# Patient Record
Sex: MALE | Race: BLACK OR AFRICAN AMERICAN | Employment: UNEMPLOYED | URBAN - METROPOLITAN AREA
[De-identification: names, ages, dates, MRNs, and addresses within clinical notes are randomized per-mention and may not be internally consistent; named-entity substitution may affect disease eponyms.]

---

## 2017-04-25 ENCOUNTER — APPOINTMENT (OUTPATIENT)
Dept: CT IMAGING | Age: 27
End: 2017-04-25
Attending: NURSE PRACTITIONER
Payer: SELF-PAY

## 2017-04-25 ENCOUNTER — HOSPITAL ENCOUNTER (EMERGENCY)
Age: 27
Discharge: HOME HEALTH | End: 2017-04-25
Attending: EMERGENCY MEDICINE
Payer: SELF-PAY

## 2017-04-25 VITALS
TEMPERATURE: 98.1 F | RESPIRATION RATE: 18 BRPM | WEIGHT: 220 LBS | SYSTOLIC BLOOD PRESSURE: 130 MMHG | HEART RATE: 76 BPM | BODY MASS INDEX: 35.36 KG/M2 | OXYGEN SATURATION: 97 % | DIASTOLIC BLOOD PRESSURE: 76 MMHG | HEIGHT: 66 IN

## 2017-04-25 DIAGNOSIS — S50.311A ELBOW ABRASION, RIGHT, INITIAL ENCOUNTER: ICD-10-CM
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
## 2017-04-25 LAB
ANION GAP BLD CALC-SCNC: 8 MMOL/L (ref 3–18)
BUN SERPL-MCNC: 11 MG/DL (ref 7–18)
BUN/CREAT SERPL: 11 (ref 12–20)
CALCIUM SERPL-MCNC: 9.2 MG/DL (ref 8.5–10.1)
CHLORIDE SERPL-SCNC: 105 MMOL/L (ref 100–108)
CO2 SERPL-SCNC: 27 MMOL/L (ref 21–32)
CREAT SERPL-MCNC: 0.96 MG/DL (ref 0.6–1.3)
GLUCOSE SERPL-MCNC: 81 MG/DL (ref 74–99)
POTASSIUM SERPL-SCNC: 4.3 MMOL/L (ref 3.5–5.5)
SODIUM SERPL-SCNC: 140 MMOL/L (ref 136–145)

## 2017-04-25 PROCEDURE — 74011636320 HC RX REV CODE- 636/320: Performed by: EMERGENCY MEDICINE

## 2017-04-25 PROCEDURE — 96360 HYDRATION IV INFUSION INIT: CPT

## 2017-04-25 PROCEDURE — 80048 BASIC METABOLIC PNL TOTAL CA: CPT | Performed by: NURSE PRACTITIONER

## 2017-04-25 PROCEDURE — 90471 IMMUNIZATION ADMIN: CPT

## 2017-04-25 PROCEDURE — 74011250636 HC RX REV CODE- 250/636: Performed by: NURSE PRACTITIONER

## 2017-04-25 PROCEDURE — 90715 TDAP VACCINE 7 YRS/> IM: CPT | Performed by: NURSE PRACTITIONER

## 2017-04-25 PROCEDURE — 72193 CT PELVIS W/DYE: CPT

## 2017-04-25 PROCEDURE — 99283 EMERGENCY DEPT VISIT LOW MDM: CPT

## 2017-04-25 RX ORDER — CEPHALEXIN 500 MG/1
500 CAPSULE ORAL 4 TIMES DAILY
Qty: 40 CAP | Refills: 0 | Status: SHIPPED | OUTPATIENT
Start: 2017-04-25 | End: 2017-05-05

## 2017-04-25 RX ORDER — SODIUM CHLORIDE 0.9 % (FLUSH) 0.9 %
5-10 SYRINGE (ML) INJECTION EVERY 8 HOURS
Status: DISCONTINUED | OUTPATIENT
Start: 2017-04-25 | End: 2017-04-25 | Stop reason: HOSPADM

## 2017-04-25 RX ORDER — TRAMADOL HYDROCHLORIDE 50 MG/1
50 TABLET ORAL
Qty: 15 TAB | Refills: 0 | Status: SHIPPED | OUTPATIENT
Start: 2017-04-25

## 2017-04-25 RX ORDER — SODIUM CHLORIDE 0.9 % (FLUSH) 0.9 %
5-10 SYRINGE (ML) INJECTION AS NEEDED
Status: DISCONTINUED | OUTPATIENT
Start: 2017-04-25 | End: 2017-04-25 | Stop reason: HOSPADM

## 2017-04-25 RX ADMIN — Medication 10 ML: at 07:35

## 2017-04-25 RX ADMIN — TETANUS TOXOID, REDUCED DIPHTHERIA TOXOID AND ACELLULAR PERTUSSIS VACCINE, ADSORBED 0.5 ML: 5; 2.5; 8; 8; 2.5 SUSPENSION INTRAMUSCULAR at 07:32

## 2017-04-25 RX ADMIN — SODIUM CHLORIDE 500 ML: 900 INJECTION, SOLUTION INTRAVENOUS at 07:34

## 2017-04-25 RX ADMIN — IOPAMIDOL 100 ML: 612 INJECTION, SOLUTION INTRAVENOUS at 09:14
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
## 2017-04-25 NOTE — ED NOTES
Bedside and Verbal shift change report given to Keysha (oncoming nurse) by Sophie Freeman (offgoing nurse). Report included the following information SBAR, Kardex and ED Summary.

## 2017-04-25 NOTE — DISCHARGE INSTRUCTIONS
Human Bites: Care Instructions  Your Care Instructions  The biggest danger from a human bite is that it might get infected. Usually the wound will not be stitched. Taking good care of your wound at home will help it heal and reduce your chance of infection. Your doctor may give you antibiotics to prevent infection and a tetanus shot if you have not had one in the last 5 years or do not know when you had your last one. Your wound may heal in less than a week, or it may take longer, depending on how bad it is. The larger it is, the longer it will take to heal.  The doctor has checked you carefully, but problems can develop later. If you notice any problems or new symptoms, get medical treatment right away. Follow-up care is a key part of your treatment and safety. Be sure to make and go to all appointments, and call your doctor if you are having problems. It's also a good idea to know your test results and keep a list of the medicines you take. How can you care for yourself at home? · If your doctor told you how to care for your wound, follow your doctor's instructions. If you did not get instructions, follow this general advice:  ¨ Wash the wound with clean water 2 times a day. Don't use hydrogen peroxide or alcohol, which can slow healing. ¨ You may cover the wound with a thin layer of petroleum jelly, such as Vaseline, and a nonstick bandage. ¨ Apply more petroleum jelly and replace the bandage as needed. · Your wound may itch or feel irritated. A little redness and swelling are normal. Do not scratch or rub the wound. · If your doctor prescribed antibiotics, take them as directed. Do not stop taking them just because you feel better. You need to take the full course of antibiotics. · Ask your doctor if you can take an over-the-counter pain medicine. When should you call for help?   Call your doctor now or seek immediate medical care if:  · The skin near the bite turns cold or pale or it changes color.  · You lose feeling in the area near the bite, or it feels numb or tingly. · You have trouble moving a limb near the bite. · You have symptoms of infection, such as:  ¨ Increased pain, swelling, warmth, or redness near the wound. ¨ Red streaks leading from the wound. ¨ Pus draining from the wound. ¨ A fever. · Blood soaks through the bandage. Oozing small amounts of blood is normal.  · Your pain is getting worse. Watch closely for changes in your health, and be sure to contact your doctor if you are not getting better as expected. Where can you learn more? Go to http://marshal-rolan.info/. Enter E007 in the search box to learn more about \"Human Bites: Care Instructions. \"  Current as of: May 27, 2016  Content Version: 11.2  © 8369-3313 [employer]. Care instructions adapted under license by [employer] (which disclaims liability or warranty for this information). If you have questions about a medical condition or this instruction, always ask your healthcare professional. [place] [address] any warranty or liability for your use of this information. Scrapes (Abrasions) in Children: Care Instructions  Your Care Instructions  Scrapes (abrasions) are wounds where the skin has been rubbed or torn off. Most scrapes do not go deep into the skin, but some may remove several layers of skin. Scrapes usually don't bleed much, but they may ooze pinkish fluid. Scrapes on the head or face may appear worse than they are. They may bleed a lot because of the good blood supply to this area. Most scrapes heal well and may not need a bandage. They usually heal within 3 to 7 days. A large, deep scrape may take 1 to 2 weeks or longer to heal. A scab may form on some scrapes. Follow-up care is a key part of your child's treatment and safety. Be sure to make and go to all appointments, and call your doctor if your child is having problems.  It's also a good idea to know your child's test results and keep a list of the medicines your child takes. How can you care for your child at home? · If your doctor told you how to care for your child's wound, follow your doctor's instructions. If you did not get instructions, follow this general advice:  ¨ Wash the scrape with clean water 2 times a day. Don't use hydrogen peroxide or alcohol, which can slow healing. ¨ You may cover the scrape with a thin layer of petroleum jelly, such as Vaseline, and a nonstick bandage. ¨ Apply more petroleum jelly and replace the bandage as needed. · Prop up the injured area on a pillow anytime your child sits or lies down during the next 3 days. Try to keep it above the level of your child's heart. This will help reduce swelling. · Be safe with medicines. Give pain medicines exactly as directed. ¨ If the doctor gave your child a prescription medicine for pain, give it as prescribed. ¨ If your child is not taking a prescription pain medicine, ask your doctor if your child can take an over-the-counter medicine. When should you call for help? Call your doctor now or seek immediate medical care if:  · Your child has signs of infection, such as:  ¨ Increased pain, swelling, warmth, or redness around the scrape. ¨ Red streaks leading from the scrape. ¨ Pus draining from the scrape. ¨ A fever. · The scrape starts to bleed, and blood soaks through the bandage. Oozing small amounts of blood is normal.  Watch closely for changes in your child's health, and be sure to contact your doctor if the scrape is not getting better each day. Where can you learn more? Go to http://marshal-rolan.info/. Enter L258 in the search box to learn more about \"Scrapes (Abrasions) in Children: Care Instructions. \"  Current as of: May 27, 2016  Content Version: 11.2  © 4497-0704 [employer].  Care instructions adapted under license by [employer] (which disclaims liability or warranty for this information). If you have questions about a medical condition or this instruction, always ask your healthcare professional. [place] [address] any warranty or liability for your use of this information.

## 2019-07-16 ENCOUNTER — HOSPITAL ENCOUNTER (OUTPATIENT)
Dept: PHYSICAL THERAPY | Age: 29
End: 2019-07-16

## 2019-07-25 ENCOUNTER — HOSPITAL ENCOUNTER (OUTPATIENT)
Dept: PHYSICAL THERAPY | Age: 29
Discharge: HOME | End: 2019-07-25
Payer: MEDICAID

## 2019-07-25 PROCEDURE — 97140 MANUAL THERAPY 1/> REGIONS: CPT

## 2019-07-25 PROCEDURE — 97110 THERAPEUTIC EXERCISES: CPT

## 2019-07-25 PROCEDURE — 97161 PT EVAL LOW COMPLEX 20 MIN: CPT
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
## 2019-07-29 ENCOUNTER — APPOINTMENT (OUTPATIENT)
Dept: PHYSICAL THERAPY | Age: 29
End: 2019-07-29
Payer: MEDICAID

## 2019-09-17 ENCOUNTER — HOSPITAL ENCOUNTER (OUTPATIENT)
Dept: PHYSICAL THERAPY | Age: 29
Discharge: HOME | End: 2019-09-17
Payer: MEDICAID

## 2019-09-17 PROCEDURE — 97164 PT RE-EVAL EST PLAN CARE: CPT

## 2019-09-17 NOTE — PROGRESS NOTES
PT DAILY TREATMENT NOTE 10-18    Patient Name: Saintclair Salinas III  Date:2019  : 1990  [x]  Patient  Verified  Payor: [facility] MEDICAID / Plan: Antonio Kappa / Product Type: Managed Care Medicaid /    In time: 12:30  Out time: 1:05  Total Treatment Time (min): 35  Visit #: 2 of 1-5    Treatment Area: Low back pain [M54.5]  Muscle spasm of back [M62.830]    SUBJECTIVE  Pain Level (0-10 scale): 7-8/10 lower back  Any medication changes, allergies to medications, adverse drug reactions, diagnosis change, or new procedure performed?: [x] No    [] Yes (see summary sheet for update)  Subjective functional status/changes:   [] No changes reported  Pt reports trying to get back to work but he couldn't tolerate pain with all of mobility. Pain mostly locates along mid back and l/s; worsened during standing, sit to stand, gentle bending forward and walking. Cont to denies numbness of tingling but pain has prevented him form waling 1-2x during the last 2 months. Pt reports being poor compliant with HEP/lost his print out.     Heat and ice pad help min with pain management     OBJECTIVE      35 min []Eval                  [x]Re-Eval             With   [] TE   [] TA   [] neuro   [] other: Patient Education: [x] Review HEP    [] Progressed/Changed HEP based on:   [] positioning   [] body mechanics   [] transfers   [] heat/ice application    [] other:      Other Objective/Functional Measures:     TTP along erector spinae and l/s paraspinal muscles  Very mild Right up slip noticed  Unable to tolerate side plank  Mod shaking with plank; tolerated for 15 seconds     L(0-5) R (0-5) N/T   Hip Flexion (L1,2) 5 5 []   Knee Extension (L3,4) 5 5 []   Ankle Dorsiflexion (L4) 5 5 []   Great Toe Extension (L5) 5 5 []   Ankle Plantarflexion (S1) ~4+ ~4+ []   Knee Flexion (S1,2) 5 5 []   Upper Abdominals   []   Lower Abdominals   []   Paraspinals   []   Back Rotators   []   Gluteus Rowdy and med 4 4 []   Other   [] Special Tests  Lumbar:  Lumb. Compression: [] Pos  [x] Neg               Lumbar Distraction:   [] Pos  [] Neg    Quadrant:  [] Pos  [] Neg   [] Flex  [] Ext    Sacroilliac:  Gaenslen's: [] R    [] L    [] +    [] -     Compression: [] +    [] -     Gapping:  [] +    [] -     Thigh Thrust: [] R    [] L    [] +    [] -     Leg Length: [] +    [] -   Position:    Crests:    ASIS:    PSIS:    Sacral Sulcus:    Mobility: Standing flex:     Sitting flex:     Supine to sit:     Prone knee bend:         Hip: Audra Winder:  [] R    [] L    [] +    [] -     Scour:  [] R    [] L    [] +    [] -     Piriformis: [] R    [] L    [] +    [] -          Deficits: Magaly's: [] R    [] L    [] +    [] -     Mark: [x] R    [x] L    [x] +    [] -     Hamstrings 90/90:  Max tightness    Gastrocsoleus (to neutral): Right: Left:       Pain Level (0-10 scale) post treatment: 7-8/10, pt denies modalities    ASSESSMENT/Changes in Function: See progress note/recertification    Patient will continue to benefit from skilled PT services to modify and progress therapeutic interventions, address functional mobility deficits, address ROM deficits, address strength deficits, analyze and address soft tissue restrictions, analyze and cue movement patterns, analyze and modify body mechanics/ergonomics, assess and modify postural abnormalities, address imbalance/dizziness and instruct in home and community integration to attain remaining goals. []  See Plan of Care  [x]  See progress note/recertification  []  See Discharge Summary         Progress towards goals / Updated goals:  Short Term Goals: To be accomplished in 1 weeks:               1. Pt will be compliant with a HEP to improve LS function. not met, lost his HEP handout 9-17-19  Long Term Goals: To be accomplished in 4 weeks:               1. Pt will increase FOTO score by 10   pts to improve LS function.   Time constraint, will re-assess next visit 9-17-19               2. Pt will reports sx reduction by >80 % to ease with return to PLOF. Regressed as pt reports increased pain with lower back 9-17-19               3. Pt will be able to stand up >1 hr w/o onset of pain to ease back to perform his job duties. Not met 9-17-19                4. PT will report pain at worst <3/10 to ease with improving QOL.  Not met, 7-10/10 9-17-19  PLAN  [x]  Upgrade activities as tolerated     [x]  Continue plan of care  []  Update interventions per flow sheet       []  Discharge due to:_  []  Other:_      Travis Thibodeaux, PT 9/17/2019  10:03 AM    Future Appointments   Date Time Provider Tim De León   9/17/2019 12:30 PM Landon Bonus [facility]

## 2019-09-17 NOTE — PROGRESS NOTES
[facility] Lashanda Tuttle  [address]  [phone] fax    Physical Therapy Progress Note  Patient name: Essie Mclaughlin III Start of Care: 2019   Referral source: Maggie Zuniga MD : 1990                Medical Diagnosis: Low back pain [M54.5]  Muscle spasm of back [M62.830]  Payor: [facility] MEDICAID / Plan: Antoine Morales / Product Type: Managed Care Medicaid /  Onset Date:19                Treatment Diagnosis: LBP   Prior Hospitalization: see medical history Provider#: [id number]   Medications: Verified on Patient summary List    Comorbidities: NONE. 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 Visits from Start of Care: 2    Missed Visits: 0    Established Goals:         Excellent           Good         Limited           None  [] Increased ROM   []  []  []  []  [] Increased Strength  []  []  []  []  [x] Increased Mobility  []  []  []  [x]   [x] Decreased Pain   []  []  []  [x]  [] Decreased Swelling  []  []  []  []    Key Functional Changes: worsening pain with lower back    Updated Goals: to be achieved in 6 weeks:   Short Term Goals: To be accomplished in 1 weeks:               1. Pt will be compliant with a HEP to improve LS function.   Long Term Goals: To be accomplished in 6 weeks:               1. Pt will increase FOTO score by 10   pts to improve LS function.                2. Pt will reports sx reduction by >80 % to ease with return to PLOF.              3. Pt will be able to stand up >1 hr w/o onset of pain to ease back to perform his job duties.              4. PT will report pain at worst <3/10 to ease with improving QOL. ASSESSMENT/RECOMMENDATIONS: Pt reports back today after nearly 2 months gap with PT. Pt reports being busy with his work schedule and he thought that his problem would improve on its own.  pt didn't follow up with MD recently since initial eval.  Pain has been worsening during the last 2 months; pt reports 1-2 episodes of severe pain that he couldn't stand or walk. Pt cont to denies any numbness/tingling; WNL with dermatomes and myotomes screening today. He demonstrates [facility] [facility] [place] of the trunk except trunk flex (only able to touch knee due max pain and tightness). TTP along erector spinae and l/s paraspinal muscles; very mild upslip of Right hip. He cont to present with poor core strength and poor flexibility of LEs' musculature. Pt also demonstrates poor compliance with given HEP and highly elevated stress level. He promised to be consistent and compliant with PT. Also recommended following up with MD if cont to experience episodes of severe pain. Patient will continue to benefit from skilled PT services to modify and progress therapeutic interventions, address functional mobility deficits, address ROM deficits, address strength deficits, analyze and address soft tissue restrictions, analyze and cue movement patterns, analyze and modify body mechanics/ergonomics, assess and modify postural abnormalities, address imbalance/dizziness and instruct in home and community integration to attain remaining goals.     [x]Continue therapy per initial plan/protocol at a frequency of  2-3 x per week for 6 weeks  []Continue therapy with the following recommended changes:_____________________      _____________________________________________________________________  []Discontinue therapy progressing towards or have reached established goals  []Discontinue therapy due to lack of appreciable progress towards goals  []Discontinue therapy due to lack of attendance or compliance  []Await Physician's recommendations/decisions regarding therapy  []Other:________________________________________________________________    Thank you for this referral.   Rogelio Villar, PT 9/17/2019 1:55 PM    NOTE TO PHYSICIAN:  [address]   FAX TO [facility]: [phone]  If you are unable to process this request in 24 hours please contact our office: [phone]    ? I have read the above report and request that my patient continue as recommended. ? I have read the above report and request that my patient continue therapy with the following changes/special instructions:____________________________________  ? I have read the above report and request that my patient be discharged from therapy.     Physicians signature: ______________________________Date: ______Time:______

## 2019-09-20 ENCOUNTER — HOSPITAL ENCOUNTER (OUTPATIENT)
Dept: PHYSICAL THERAPY | Age: 29
Discharge: HOME | End: 2019-09-20
Payer: MEDICAID

## 2019-09-20 PROCEDURE — 97110 THERAPEUTIC EXERCISES: CPT

## 2019-09-20 NOTE — PROGRESS NOTES
PT DAILY TREATMENT NOTE 10-18    Patient Name: Cierra Sheppard III  Date:2019  : 1990  [x]  Patient  Verified  Payor: [facility] MEDICAID / Plan: [facility] [employer] / Product Type: Managed Care Medicaid /    In time:740  Out time:815  Total Treatment Time (min): 35  Visit #: 3 of 8    Treatment Area: Low back pain [M54.5]  Muscle spasm of back [M62.830]    SUBJECTIVE  Pain Level (0-10 scale): 8/10  Any medication changes, allergies to medications, adverse drug reactions, diagnosis change, or new procedure performed?: [x] No    [] Yes (see summary sheet for update)  Subjective functional status/changes:   [] No changes reported  Pt stated that he is still having a lot of pain today    OBJECTIVE    Modality rationale: decrease pain and increase tissue extensibility to improve the patients ability to increase ease with ADLs   Min Type Additional Details    [] Estim:  []Unatt       []IFC  []Premod                        []Other:  []w/ice   []w/heat  Position:  Location:    [] Estim: []Att    []TENS instruct  []NMES                    []Other:  []w/US   []w/ice   []w/heat  Position:  Location:    []  Traction: [] Cervical       []Lumbar                       [] Prone          []Supine                       []Intermittent   []Continuous Lbs:  [] before manual  [] after manual    []  Ultrasound: []Continuous   [] Pulsed                           []1MHz   []3MHz W/cm2:  Location:    []  Iontophoresis with dexamethasone         Location: [] Take home patch   [] In clinic   10 []  Ice     [x]  heat  []  Ice massage  []  Laser   []  Anodyne Position:seated  Location:low back    []  Laser with stim  []  Other:  Position:  Location:    []  Vasopneumatic Device Pressure:       [] lo [] med [] hi   Temperature: [] lo [] med [] hi   [x] Skin assessment post-treatment:  [x]intact []redness- no adverse reaction    []redness  adverse reaction:     25 min Therapeutic Exercise:  [x] See flow sheet :   Rationale: increase ROM and increase strength to improve the patients ability to increase ease with ADLs    With   [x] TE   [] TA   [] neuro   [] other: Patient Education: [x] Review HEP    [] Progressed/Changed HEP based on:   [] positioning   [] body mechanics   [] transfers   [] heat/ice application    [] other:      Other Objective/Functional Measures:   Pt was 10 minutes late for session  Pt kept stopping therapy to answer the phone and make phone calls  Had no complaint of increased pain with exercises     Pain Level (0-10 scale) post treatment: 4/10    ASSESSMENT/Changes in Function:   Initiated therex today per flow sheet. Pt put forth fair effort with exercises    Patient will continue to benefit from skilled PT services to modify and progress therapeutic interventions, address functional mobility deficits, address ROM deficits, address strength deficits, analyze and cue movement patterns, analyze and modify body mechanics/ergonomics, assess and modify postural abnormalities and instruct in home and community integration to attain remaining goals. []  See Plan of Care  [x]  See progress note/recertification  []  See Discharge Summary         Progress towards goals / Updated goals:  Short Term Goals: To be accomplished in 1 weeks:               1. Pt will be compliant with a HEP to improve LS function.   Long Term Goals: To be accomplished in 6 weeks:               1. Pt will increase FOTO score by 10   pts to improve LS function.                2. Pt will reports sx reduction by >80 % to ease with return to PLOF.              3. Pt will be able to stand up >1 hr w/o onset of pain to ease back to perform his job duties.              4. PT will report pain at worst <3/10 to ease with improving QOL.   Not met.  9/20/19    PLAN  []  Upgrade activities as tolerated     [x]  Continue plan of care  []  Update interventions per flow sheet       []  Discharge due to:_  []  Other:_      Karyn Freeze, PTA 9/20/2019  7:43 AM    Future Appointments   Date Time Provider Tim De León   9/24/2019  8:30 AM Mike Frost, PT [facility]   9/25/2019  7:30 AM Harden Nicola, PTA [facility]   9/27/2019  8:00 AM Mike Frost, PT [id number]   9/30/2019  7:30 AM Harden Nicola, PTA [facility]   10/2/2019  7:30 AM Harden Nicola, PTA [facility]   10/4/2019  7:30 AM Harden Nicola, PTA [facility]   10/8/2019  8:00 AM Mike Frost, PT [facility]   10/10/2019  8:00 AM Mike Frost, PT [id number]   10/11/2019  7:30 AM Harden Nicola, PTA [facility]   10/14/2019  7:30 AM Harden Nicola, PTA [facility]   10/16/2019  7:30 AM Harden Nicola, PTA [facility]   10/18/2019  8:00 AM Mike Frost, PT [id number]   10/21/2019  7:30 AM Harden Nicola, PTA [facility]   10/23/2019  7:30 AM Harden Nicola, PTA [facility]   10/25/2019  8:00 AM Aime Davis, PT [facility]

## 2019-09-24 ENCOUNTER — HOSPITAL ENCOUNTER (OUTPATIENT)
Dept: PHYSICAL THERAPY | Age: 29
Discharge: HOME | End: 2019-09-24
Payer: MEDICAID

## 2019-09-24 PROCEDURE — 97110 THERAPEUTIC EXERCISES: CPT | Performed by: GENERAL ACUTE CARE HOSPITAL

## 2019-09-24 NOTE — PROGRESS NOTES
PT DAILY TREATMENT NOTE 10-18    Patient Name: Gallito Glass III  Date:2019  : 1990  [x]  Patient  Verified  Payor: [facility] MEDICAID / Plan: [employer] Sol[employer] / Product Type: Managed Care Medicaid /    In time:8:36  Out time: 9:12  Total Treatment Time (min): 36  Visit #: 4 of 8    Treatment Area: Low back pain [M54.5]  Muscle spasm of back [M62.830]    SUBJECTIVE  Pain Level (0-10 scale): 7/10  Any medication changes, allergies to medications, adverse drug reactions, diagnosis change, or new procedure performed?: [x] No    [] Yes (see summary sheet for update)  Subjective functional status/changes:   [] No changes reported  \"It's feeling a bit better today\" Pt reports doing stretches every morning, which is helping.      OBJECTIVE    Modality rationale: decrease pain and increase tissue extensibility to improve the patients ability to increase ease with ADLs   Min Type Additional Details    [] Estim:  []Unatt       []IFC  []Premod                        []Other:  []w/ice   []w/heat  Position:  Location:    [] Estim: []Att    []TENS instruct  []NMES                    []Other:  []w/US   []w/ice   []w/heat  Position:  Location:    []  Traction: [] Cervical       []Lumbar                       [] Prone          []Supine                       []Intermittent   []Continuous Lbs:  [] before manual  [] after manual    []  Ultrasound: []Continuous   [] Pulsed                           []1MHz   []3MHz W/cm2:  Location:    []  Iontophoresis with dexamethasone         Location: [] Take home patch   [] In clinic   10 []  Ice     [x]  heat  []  Ice massage  []  Laser   []  Anodyne Position:seated  Location:low back    []  Laser with stim  []  Other:  Position:  Location:    []  Vasopneumatic Device Pressure:       [] lo [] med [] hi   Temperature: [] lo [] med [] hi   [x] Skin assessment post-treatment:  [x]intact []redness- no adverse reaction    []redness  adverse reaction:     26 min Therapeutic Exercise:  [x] See flow sheet :   Rationale: increase ROM and increase strength to improve the patients ability to increase ease with ADLs    With   [x] TE   [] TA   [] neuro   [] other: Patient Education: [x] Review HEP    [] Progressed/Changed HEP based on:   [] positioning   [] body mechanics   [] transfers   [] heat/ice application    [] other:      Other Objective/Functional Measures:   Good effort observed with all therex today. Added TRx rows and SKTC stretch. Pain Level (0-10 scale) post treatment: 6/10    ASSESSMENT/Changes in Function:   Good performance with exercises, requiring occasional VC's for correct performance. Provided tactile cues during TRX rows for proper mm facilitation and cues to reduce UT compensation. Pt continues to have pain with forward bending. Patient will continue to benefit from skilled PT services to modify and progress therapeutic interventions, address functional mobility deficits, address ROM deficits, address strength deficits, analyze and cue movement patterns, analyze and modify body mechanics/ergonomics, assess and modify postural abnormalities and instruct in home and community integration to attain remaining goals. []  See Plan of Care  [x]  See progress note/recertification  []  See Discharge Summary         Progress towards goals / Updated goals:  Short Term Goals: To be accomplished in 1 weeks:               1. Pt will be compliant with a HEP to improve LS function.   Long Term Goals: To be accomplished in 6 weeks:               1. Pt will increase FOTO score by 10   pts to improve LS function.                2. Pt will reports sx reduction by >80 % to ease with return to PLOF.              3. Pt will be able to stand up >1 hr w/o onset of pain to ease back to perform his job duties.              4. PT will report pain at worst <3/10 to ease with improving QOL.   Not met.  9/20/19    PLAN  []  Upgrade activities as tolerated     [x]  Continue plan of care  []  Update interventions per flow sheet       []  Discharge due to:_  []  Other:_      Robbie Marcelorison, PT 9/24/2019  7:43 AM    Future Appointments   Date Time Provider Tim De León   9/25/2019  7:30 AM Thuy Layton, PTA [facility]   9/27/2019  8:00 AM Kash Samson, PT [id number] [facility]   9/30/2019  7:30 AM Thuy Layton, PTA [facility]   10/2/2019  7:30 AM Thuy Layton, PTA [facility]   10/4/2019  7:30 AM Thuy Layton, PTA [facility]   10/8/2019  8:00 AM Kash Samson, PT [id number] [facility]   10/10/2019  8:00 AM Kash Samson, PT [id number] [facility]   10/11/2019  7:30 AM Thuy Layton, PTA [facility]   10/14/2019  7:30 AM Thuy Layton, PTA [facility]   10/16/2019  7:30 AM Thuy Layton, PTA [facility]   10/18/2019  8:00 AM Kash Samson, PT [id number] [facility]   10/21/2019  7:30 AM Thuy Layton, PTA [facility]   10/23/2019  7:30 AM Thuy Layton, PTA [facility]   10/25/2019  8:00 AM Geetha Souza, PT [facility]

## 2019-09-25 ENCOUNTER — HOSPITAL ENCOUNTER (OUTPATIENT)
Dept: PHYSICAL THERAPY | Age: 29
Discharge: HOME | End: 2019-09-25
Payer: MEDICAID

## 2019-09-25 PROCEDURE — 97110 THERAPEUTIC EXERCISES: CPT

## 2019-09-25 NOTE — PROGRESS NOTES
PT DAILY TREATMENT NOTE 10-18    Patient Name: Gio Martinez III  Date:2019  : 1990  [x]  Patient  Verified  Payor: [facility] MEDICAID / Plan: [facility] / Product Type: Managed Care Medicaid /    In time:902  Out time:938  Total Treatment Time (min): 36  Visit #: 5 of 8    Treatment Area: Low back pain [M54.5]  Muscle spasm of back [M62.830]    SUBJECTIVE  Pain Level (0-10 scale): 7/10  Any medication changes, allergies to medications, adverse drug reactions, diagnosis change, or new procedure performed?: [x] No    [] Yes (see summary sheet for update)  Subjective functional status/changes:   [] No changes reported  Pt stated that he is about the same    OBJECTIVE    Modality rationale: decrease pain and increase tissue extensibility to improve the patients ability to increase ease with ADLs   Min Type Additional Details    [] Estim:  []Unatt       []IFC  []Premod                        []Other:  []w/ice   []w/heat  Position:  Location:    [] Estim: []Att    []TENS instruct  []NMES                    []Other:  []w/US   []w/ice   []w/heat  Position:  Location:    []  Traction: [] Cervical       []Lumbar                       [] Prone          []Supine                       []Intermittent   []Continuous Lbs:  [] before manual  [] after manual    []  Ultrasound: []Continuous   [] Pulsed                           []1MHz   []3MHz W/cm2:  Location:    []  Iontophoresis with dexamethasone         Location: [] Take home patch   [] In clinic   10 []  Ice     [x]  heat  []  Ice massage  []  Laser   []  Anodyne Position: seated  Location:low back    []  Laser with stim  []  Other:  Position:  Location:    []  Vasopneumatic Device Pressure:       [] lo [] med [] hi   Temperature: [] lo [] med [] hi   [x] Skin assessment post-treatment:  [x]intact []redness- no adverse reaction    []redness  adverse reaction:     26 min Therapeutic Exercise:  [x] See flow sheet :   Rationale: increase ROM and increase strength to improve the patients ability to increase ease with ADLs    With   [x] TE   [] TA   [] neuro   [] other: Patient Education: [x] Review HEP    [] Progressed/Changed HEP based on:   [] positioning   [] body mechanics   [] transfers   [] heat/ice application    [] other:      Other Objective/Functional Measures:   Had no difficulty with exercises  Needed cueing with TRX exercises  Had good lumbar range of motion with LTR and open books     Pain Level (0-10 scale) post treatment: 5/10    ASSESSMENT/Changes in Function:   Pt is slowly progressing toward goals. Pt cont with significant reported pain levels. Pt cont to report decreased walking and standing tolerance. Lumbar range of motion is improving. Patient will continue to benefit from skilled PT services to modify and progress therapeutic interventions, address functional mobility deficits, address ROM deficits, address strength deficits, analyze and cue movement patterns, analyze and modify body mechanics/ergonomics, assess and modify postural abnormalities and instruct in home and community integration to attain remaining goals. []  See Plan of Care  [x]  See progress note/recertification  []  See Discharge Summary         Progress towards goals / Updated goals:  Short Term Goals: To be accomplished in 1 weeks:               1. Pt will be compliant with a HEP to improve LS function.   Long Term Goals: To be accomplished in 6 weeks:               1. Pt will increase FOTO score by 10   pts to improve LS function.                2. Pt will reports sx reduction by >80 % to ease with return to PLOF.              3. Pt will be able to stand up >1 hr w/o onset of pain to ease back to perform his job duties.              4. PT will report pain at worst <3/10 to ease with improving QOL.   Not met. Cont with 7/10 pain.  9/25/19    PLAN  []  Upgrade activities as tolerated     [x]  Continue plan of care  []  Update interventions per flow sheet []  Discharge due to:_  []  Other:_      Camilla Kapadia, PTA 9/25/2019  9:09 AM    Future Appointments   Date Time Provider Tim Genet   9/27/2019  8:00 AM Benay Fix, PT [email or web address] [facility]   9/30/2019  7:30 AM Earvin Spoon, PTA MMCPTPB [facility]   10/2/2019  7:30 AM Earvin Spoon, PTA MMCPTPB [facility]   10/4/2019  7:30 AM Earvin Spoon, PTA MMCPTPB [facility]   10/8/2019  8:00 AM Benay Fix, PT [id number] [facility]   10/10/2019  8:00 AM Benay Fix, PT [id number] [facility]   10/11/2019  7:30 AM Earvin Spoon, PTA MMCPTPB [facility]   10/14/2019  7:30 AM Harsh Walker MMCPTPB [facility]   10/16/2019  7:30 AM Earvin Spoon, PTA MMCPTPB [facility]   10/18/2019  8:00 AM Benay Fix, PT [id number] [facility]   10/21/2019  7:30 AM Earvin Spoon, PTA MMCPTPB [facility]   10/23/2019  7:30 AM Earvin Spoon, PTA MMCPTPB [facility]   10/25/2019  8:00 AM Perrykaila Raphael Render Blocker, PT MMCPTPB [facility]

## 2019-09-27 ENCOUNTER — HOSPITAL ENCOUNTER (OUTPATIENT)
Dept: PHYSICAL THERAPY | Age: 29
Discharge: HOME | End: 2019-09-27
Payer: MEDICAID

## 2019-09-27 PROCEDURE — 97110 THERAPEUTIC EXERCISES: CPT

## 2019-09-27 NOTE — PROGRESS NOTES
PT DAILY TREATMENT NOTE 10-18    Patient Name: Chante Ch III  Date:2019  : 1990  [x]  Patient  Verified  Payor: [facility] MEDICAID / Plan: [employer] / Product Type: Managed Care Medicaid /    In time:810  Out time:840  Total Treatment Time (min): 30  Visit #: 6 of 8    Treatment Area: Low back pain [M54.5]  Muscle spasm of back [M62.830]    SUBJECTIVE  Pain Level (0-10 scale): 5/10  Any medication changes, allergies to medications, adverse drug reactions, diagnosis change, or new procedure performed?: [x] No    [] Yes (see summary sheet for update)  Subjective functional status/changes:   [] No changes reported  Pt reports he is having trouble getting comfortable sleeping at night; tried using pillows in between legs. Has most pain standing on his feet all day at work and when bending. OBJECTIVE    30 min Therapeutic Exercise:  [x] See flow sheet :   Rationale: increase ROM and increase strength to improve the patients ability to increase ease with ADLs    With   [x] TE   [] TA   [] neuro   [] other: Patient Education: [x] Review HEP    [] Progressed/Changed HEP based on:   [] positioning   [] body mechanics   [] transfers   [] heat/ice application    [] other:      Other Objective/Functional Measures: Added bridge and SB DKTC, reports no increase in pain  Pt has pain with lumbar flexion when bending  Educated pt on squatting/hip hinge mechanics to reduce l/s flare up         Pain Level (0-10 scale) post treatment: 0/10    ASSESSMENT/Changes in Function:  Patient reports pain is decreasing, although has increased pain with bending. He is able to perform bridge with good form and no increase in back pain. Educated pt on squatting maintaining neutral spine.     Patient will continue to benefit from skilled PT services to modify and progress therapeutic interventions, address functional mobility deficits, address ROM deficits, address strength deficits, analyze and cue movement patterns, analyze and modify body mechanics/ergonomics, assess and modify postural abnormalities and instruct in home and community integration to attain remaining goals. []  See Plan of Care  [x]  See progress note/recertification  []  See Discharge Summary         Progress towards goals / Updated goals:  Short Term Goals: To be accomplished in 1 weeks:               1. Pt will be compliant with a HEP to improve LS function.   Long Term Goals: To be accomplished in 6 weeks:               1. Pt will increase FOTO score by 10   pts to improve LS function.                2. Pt will reports sx reduction by >80 % to ease with return to PLOF.              3. Pt will be able to stand up >1 hr w/o onset of pain to ease back to perform his job duties.              4. PT will report pain at worst <3/10 to ease with improving QOL.   Not met. Cont with 7/10 pain.  9/25/19    PLAN  []  Upgrade activities as tolerated     [x]  Continue plan of care  []  Update interventions per flow sheet       []  Discharge due to:_  []  Other:_      Lanice Schaumann, PT 9/27/2019  9:09 AM    Future Appointments   Date Time Provider Tim De León   9/30/2019  7:30 AM Heidi Ackerman PTA MMCPTPB [facility]   10/2/2019  7:30 AM Heidi Ackerman PTA MMCPTPB [facility]   10/4/2019  7:30 AM Heidi Ackerman PTA MMCPTPB [facility]   10/8/2019  8:00 AM Christian Dunbary, PT [id number] [facility]   10/10/2019  8:00 AM Christian Dunbary, PT [id number] [facility]   10/11/2019  7:30 AM Heidi Ackerman PTA MMCPTPB [facility]   10/14/2019  7:30 AM Ray Burnett PTA MMCPTPB [facility]   10/16/2019  7:30 AM Heidi Ackerman PTA MMCPTPB [facility]   10/18/2019  8:00 AM Christian Azevedo, PT [id number] [facility]   10/21/2019  7:30 AM Heidi Ackerman, PTA MMCPTPB [facility]   10/23/2019  7:30 AM Heidi Ackerman, PTA MMCPTPB [facility]   10/25/2019  8:00 AM Evangelina Whitney, PT MMCPTPB [facility]

## 2019-09-30 ENCOUNTER — APPOINTMENT (OUTPATIENT)
Dept: PHYSICAL THERAPY | Age: 29
End: 2019-09-30
Payer: MEDICAID

## 2019-10-01 ENCOUNTER — HOSPITAL ENCOUNTER (OUTPATIENT)
Dept: PHYSICAL THERAPY | Age: 29
Discharge: HOME | End: 2019-10-01
Payer: MEDICAID

## 2019-10-01 PROCEDURE — 97110 THERAPEUTIC EXERCISES: CPT

## 2019-10-01 NOTE — PROGRESS NOTES
PT DAILY TREATMENT NOTE 10-18    Patient Name: Randell Eng  Date:10/1/2019  : 1990  [x]  Patient  Verified  Payor: SELF PAY / Plan: [facility] SELF PAY / Product Type: Self Pay /    In time:1003  Out time:1032  Total Treatment Time (min): 29  Visit #: 7 of 8    Treatment Area: Low back pain [M54.5]  Muscle spasm of back [M62.830]    SUBJECTIVE  Pain Level (0-10 scale): 5/10  Any medication changes, allergies to medications, adverse drug reactions, diagnosis change, or new procedure performed?: [x] No    [] Yes (see summary sheet for update)  Subjective functional status/changes:   [] No changes reported  Pt reports he is feeling better since doing more of his exercises at home and stretches every morning. OBJECTIVE    29 min Therapeutic Exercise:  [x] See flow sheet :   Rationale: increase ROM and increase strength to improve the patients ability to increase ease with ADLs    With   [x] TE   [] TA   [] neuro   [] other: Patient Education: [x] Review HEP    [] Progressed/Changed HEP based on:   [] positioning   [] body mechanics   [] transfers   [] heat/ice application    [] other:      Other Objective/Functional Measures:   Pt able to perform squats with 10 KB with- out back pain; fair form increased lordosis improved with cues  Review hip hinge  Full forward flexion AROM no back pain        Pain Level (0-10 scale) post treatment: 0/10    ASSESSMENT/Changes in Function:  Patient is making steady progress towards goals. He has no pain with full AROM forward flexion. He is able to perform squats with 10# kettle bell with out increase in pain. Pt demonstrates increased l/s lordosis when squatting and will benefit from practicing form to reduce strain on l/s.      Patient will continue to benefit from skilled PT services to modify and progress therapeutic interventions, address functional mobility deficits, address ROM deficits, address strength deficits, analyze and cue movement patterns, analyze and modify body mechanics/ergonomics, assess and modify postural abnormalities and instruct in home and community integration to attain remaining goals. []  See Plan of Care  [x]  See progress note/recertification  []  See Discharge Summary         Progress towards goals / Updated goals:  Short Term Goals: To be accomplished in 1 weeks:               1. Pt will be compliant with a HEP to improve LS function.   Long Term Goals: To be accomplished in 6 weeks:               1. Pt will increase FOTO score by 10   pts to improve LS function.                2. Pt will reports sx reduction by >80 % to ease with return to PLOF.              3. Pt will be able to stand up >1 hr w/o onset of pain to ease back to perform his job duties.              4. PT will report pain at worst <3/10 to ease with improving QOL.    Progressing 10/1    PLAN  [x]  Upgrade activities as tolerated     [x]  Continue plan of care  []  Update interventions per flow sheet       []  Discharge due to:_  []  Other:_      Juancarlos Morel, PT 10/1/2019  9:09 AM    Future Appointments   Date Time Provider Tim De León   10/2/2019  7:30 AM Bety Area, PTA MMCPTPB [facility]   10/4/2019  7:30 AM Bety Area, PTA MMCPTPB [facility]   10/8/2019  8:00 AM Boone Steve, PT [id number] [facility]   10/10/2019  8:00 AM Boone Steve, PT [id number] [facility]   10/11/2019  7:30 AM Bety Area, PTA [facility]CPTPB [facility]   10/14/2019  7:30 AM Ben Damien MMCPTPB [facility]   10/16/2019  7:30 AM Bety Area, PTA MMCPTPB [facility]   10/18/2019  8:00 AM Contreras Damien MMCPTPB [facility]   10/21/2019  7:30 AM Bety Area, PTA MMCPTPB [facility]   10/23/2019  7:30 AM Bety Pool, PTA MMCPTPB [facility]   10/25/2019  8:00 AM Boone Steve, PT MMCPTPB [facility]

## 2019-10-02 ENCOUNTER — HOSPITAL ENCOUNTER (OUTPATIENT)
Dept: PHYSICAL THERAPY | Age: 29
Discharge: HOME | End: 2019-10-02
Payer: MEDICAID

## 2019-10-02 PROCEDURE — 97110 THERAPEUTIC EXERCISES: CPT

## 2019-10-04 ENCOUNTER — HOSPITAL ENCOUNTER (OUTPATIENT)
Dept: PHYSICAL THERAPY | Age: 29
Discharge: HOME | End: 2019-10-04
Payer: MEDICAID

## 2019-10-04 PROCEDURE — 97140 MANUAL THERAPY 1/> REGIONS: CPT

## 2019-10-04 PROCEDURE — 97110 THERAPEUTIC EXERCISES: CPT

## 2019-10-04 NOTE — PROGRESS NOTES
PT DAILY TREATMENT NOTE 10-18    Patient Name: Ena Mcclain  Date:10/4/2019  : 1990  [x]  Patient  Verified  Payor: /    In time: 11:14  Out time:11:50  Total Treatment Time (min): 36  Visit #: 9 of     Treatment Area: Low back pain [M54.5]  Muscle spasm of back [M62.830]    SUBJECTIVE  Pain Level (0-10 scale): 0/10  Any medication changes, allergies to medications, adverse drug reactions, diagnosis change, or new procedure performed?: [x] No    [] Yes (see summary sheet for update)  Subjective functional status/changes:   [] No changes reported  Pt reports doing much better overall, he feels like he's about to to complete normal     OBJECTIVE    28 min Therapeutic Exercise:  [x] See flow sheet :   Rationale: increase ROM, increase strength, improve coordination and increase proprioception to improve the patients ability to perform amb/job duties with ease    8 min Manual Therapy:  DTM/TPR with tennis ball for t/s and l/s paraspinal muscles   Rationale: decrease pain, increase ROM, increase tissue extensibility, decrease trigger points and increase postural awareness to perform ADLs/job duties with ease       With   [] TE   [] TA   [] neuro   [] other: Patient Education: [x] Review HEP    [] Progressed/Changed HEP based on:   [] positioning   [] body mechanics   [] transfers   [] heat/ice application    [] other:      Other Objective/Functional Measures:    Able to tolerate side plank for 10 sec on Left, 16 sec on Right   Good form with all therex   Mod soreness with bridges    Pleased with DTM with tennis ball     Pain Level (0-10 scale) post treatment: 0/10    ASSESSMENT/Changes in Function: pt making good progress with singificant improvement of pain and overall strength. He reports that he just needs a little bit more time with PT to make sure than the pain not coming back. Will cont for 2-3 more weeks to progress core strengthening and update HEP; pt pleased with his progression.  Will cont to progress strengthening therex as tolerated. Patient will continue to benefit from skilled PT services to modify and progress therapeutic interventions, address functional mobility deficits, address ROM deficits, address strength deficits, analyze and cue movement patterns, analyze and modify body mechanics/ergonomics, assess and modify postural abnormalities and instruct in home and community integration to attain remaining goals. []  See Plan of Care  [x]  See progress note/recertification  []  See Discharge Summary         Progress towards goals / Updated goals:  Short Term Goals: To be accomplished in 1 weeks:               1. Pt will be compliant with a HEP to improve LS function.   Long Term Goals: To be accomplished in 6 weeks:               1. Pt will increase FOTO score by 10   pts to improve LS function.                2. Pt will reports sx reduction by >80 % to ease with return to PLOF.              3. Pt will be able to stand up >1 hr w/o onset of pain to ease back to perform his job duties.              4. PT will report pain at worst <3/10 to ease with improving QOL.    Progressing 10/1      PLAN  []  Upgrade activities as tolerated     [x]  Continue plan of care  []  Update interventions per flow sheet       []  Discharge due to:_  []  Other:_       Cori Napoles 10/4/2019  10:24 AM    Future Appointments   Date Time Provider Tim De León   10/4/2019 11:00 AM Juan Poe [id number] [facility]   10/8/2019  9:00 AM Lisette Cooley, PT MMCPTPB [facility]   10/10/2019 10:00 AM Jaida Colvin, PTA [id number] [facility]   10/11/2019  7:30 AM Jaida Colvin, PTA [id number] [facility]   10/14/2019 10:30 AM Augusto Purvis, PTA MMCPTPB [facility]   10/16/2019 10:30 AM Jaida Colvin, PTA MMCPTPB [facility]   10/18/2019  8:00 AM Mimi De La Cruz MMCPTPB [facility]   10/21/2019 10:30 AM Chantel Condon, PT [id number] [facility]   10/23/2019 10:00 AM Jaida Colvin PTA MMCPTPB [facility]   10/25/2019  8:00 AM Chantel Condon, PT [id number] [facility] 10/29/2019 10:00 AM Kristofer Neri PT [id number] [address]   10/31/2019 10:30 AM Digna Cash PTA MMCPTPB [address]

## 2019-10-08 ENCOUNTER — APPOINTMENT (OUTPATIENT)
Dept: PHYSICAL THERAPY | Age: 29
End: 2019-10-08
Payer: MEDICAID

## 2019-10-11 ENCOUNTER — HOSPITAL ENCOUNTER (OUTPATIENT)
Dept: PHYSICAL THERAPY | Age: 29
Discharge: HOME | End: 2019-10-11
Payer: MEDICAID

## 2019-10-11 PROCEDURE — 97110 THERAPEUTIC EXERCISES: CPT

## 2019-10-11 NOTE — PROGRESS NOTES
PT DAILY TREATMENT NOTE 10-18    Patient Name: Beau Sparks  Date:10/11/2019  : 1990  [x]  Patient  Verified  Payor: [facility] MEDICAID / Plan: Samuel Nobles / Product Type: Managed Care Medicaid /    In time:740  Out time:801  Total Treatment Time (min): 21  Visit #: 10 of     Treatment Area: Low back pain [M54.5]  Muscle spasm of back [M62.830]    SUBJECTIVE  Pain Level (0-10 scale): 1/10  Any medication changes, allergies to medications, adverse drug reactions, diagnosis change, or new procedure performed?: [x] No    [] Yes (see summary sheet for update)  Subjective functional status/changes:   [] No changes reported  Pt stated that he is about ready to start back to the gym    OBJECTIVE    21 min Therapeutic Exercise:  [x] See flow sheet :   Rationale: increase ROM and increase strength to improve the patients ability to increase ease with ADLs    With   [x] TE   [] TA   [] neuro   [] other: Patient Education: [x] Review HEP    [] Progressed/Changed HEP based on:   [] positioning   [] body mechanics   [] transfers   [] heat/ice application    [] other:      Other Objective/Functional Measures:   Pt was 10 minutes late for session  Pt was educated on proper lifting technique  Pt needed multiple cues to perform lifting correctly  Pt was informed that if he misses one more appointment without canceling he would be discharged due to 2 previous NS    Pain Level (0-10 scale) post treatment: 0/10    ASSESSMENT/Changes in Function:   Pt is progressing well toward goals. Pt stated today that he is about ready to go to the gym.  Pt is to make appointment with MD and if cleared by MD he wants to be discharged    Patient will continue to benefit from skilled PT services to modify and progress therapeutic interventions, address functional mobility deficits, address ROM deficits, address strength deficits, analyze and modify body mechanics/ergonomics and instruct in home and community integration to attain remaining goals. []  See Plan of Care  [x]  See progress note/recertification  []  See Discharge Summary         Progress towards goals / Updated goals:  Short Term Goals: To be accomplished in 1 weeks:               1. Pt will be compliant with a HEP to improve LS function.   Long Term Goals: To be accomplished in 6 weeks:               1. Pt will increase FOTO score by 10   pts to improve LS function.                2. Pt will reports sx reduction by >80 % to ease with return to PLOF. Progressing. 10/11/19               3. Pt will be able to stand up >1 hr w/o onset of pain to ease back to perform his job duties.              4. PT will report pain at worst <3/10 to ease with improving QOL.    Progressing 10/1     PLAN  []  Upgrade activities as tolerated     [x]  Continue plan of care  []  Update interventions per flow sheet       []  Discharge due to:_  []  Other:_      Veronica Ma PTA 10/11/2019  7:44 AM    Future Appointments   Date Time Provider Tim De León   10/14/2019 10:30 AM Ben Quezada MMCPTPB [facility]   10/16/2019 10:30 AM Bety Pool PTA MMCPTPB [facility]   10/18/2019  8:00 AM Salina Chow PTA MMCPTPB [facility]   10/21/2019 10:30 AM Boone Steve, PT [id number] [facility]   10/23/2019 10:00 AM Bety Pool, PTA MMCPTPB [facility]   10/25/2019  8:00 AM Boone Steve, PT [id number] [facility]   10/29/2019 10:00 AM Boone Steve, PT [id number] [facility]   10/31/2019 10:30 AM Bety Pool, PTA MMCPTPB [facility]

## 2019-10-14 ENCOUNTER — HOSPITAL ENCOUNTER (OUTPATIENT)
Dept: PHYSICAL THERAPY | Age: 29
Discharge: HOME | End: 2019-10-14
Payer: MEDICAID

## 2019-10-14 PROCEDURE — 97110 THERAPEUTIC EXERCISES: CPT

## 2019-10-14 NOTE — PROGRESS NOTES
Physical Therapy Discharge Instructions      [facility] [address]  [address] [facility]  [phone] fax    Patient: Italo Joseph  : 1990      Continue Home Exercise Program 2 times per day for 4 weeks, then decrease to 3-5 times per week      Continue with    [x] Ice  as needed  [x] Heat           Follow up with MD:     [] Upon completion of therapy     [x] As needed  Recommendations:     [x]   Return to activity with home program    []   Return to activity with the following modifications:       []Post Rehab Program    []Join Independent aquatic program     [x]Return to/join local gym    Additional Comments: Keep up the great work at home. Start off slow getting back into gym, focusing on good form.        Eddie Mendoza, PT 10/14/2019 10:50 AM

## 2019-10-14 NOTE — PROGRESS NOTES
PT DAILY TREATMENT NOTE 10-18    Patient Name: Sawyer Diehl III  Date:10/14/2019  : 1990  [x]  Patient  Verified  Payor: [facility] MEDICAID / Plan: Dago Gardner / Product Type: Managed Care Medicaid /    In time: 10:33  Out time: 11:08  Total Treatment Time (min): 35  Visit #: 11     Treatment Area: Low back pain [M54.5]  Muscle spasm of back [M62.830]    SUBJECTIVE  Pain Level (0-10 scale):  2/10  Any medication changes, allergies to medications, adverse drug reactions, diagnosis change, or new procedure performed?: [x] No    [] Yes (see summary sheet for update)  Subjective functional status/changes:   [] No changes reported  Pt. Reports he is doing pretty good today. He reports he still avoids lifting at home. OBJECTIVE    35 min Therapeutic Exercise:  [x] See flow sheet :   Rationale: increase ROM and increase strength to improve the patients ability to increase ease of ADLs          With   [] TE   [] TA   [] neuro   [] other: Patient Education: [x] Review HEP    [] Progressed/Changed HEP based on:   [] positioning   [] body mechanics   [] transfers   [] heat/ice application    [] other:      Other Objective/Functional Measures: FOTO:  % improvement in goals: 75%  Standing tolerance: Reports no difficulty with standing  Pt.  Lifted 55# with no increase in pain and good form  He tolerated PT well with no reports of increased pain    Pain Level (0-10 scale) post treatment: 0/10    ASSESSMENT/Changes in Function:      []  See Plan of Care  []  See progress note/recertification  [x]  See Discharge Summary         Progress towards goals / Updated goals:  See D/C note    PLAN  []  Upgrade activities as tolerated     []  Continue plan of care  []  Update interventions per flow sheet       []  Discharge due to:_  []  Other:_      Latrice Rodriguez, PT 10/14/2019  7:48 AM    Future Appointments   Date Time Provider Tim De León   10/14/2019 10:30 AM Thuan Amezcua, PT [email or web address]   10/16/2019 10:30 AM Firman Escort, PTA [email or web address]   10/18/2019  8:00 AM Berryyde Anju [facility]   10/21/2019 10:30 AM Mei Rise, PT [email or web address]   10/23/2019 10:00 AM Firman Escort, PTA [email or web address]   10/25/2019  8:00 AM Mei Rise, PT [email or web address] [address] [facility]   10/29/2019 10:00 AM Mei Rise, PT [id number]   10/31/2019 10:30 AM Firman Escort, PTA [email or web address]

## 2019-10-14 NOTE — PROGRESS NOTES
[facility] Ernestine Hunt  [address] [facility]  [phone] fax    Physical Therapy Discharge Summary    Patient name: Magno Bruno Start of Care: 7/25/2019   Referral Venecia Fajardo MD VWU: [id number]                Medical Diagnosis: Low back pain [M54.5]  Muscle spasm of back [M62.830]  Payor: [facility] MEDICAID / Plan: Moisésnatali Finn / Product Type: Managed Care Medicaid /  Onset Date:07/05/19                Treatment Diagnosis: LBP   Prior Hospitalization: see medical history Provider#: [id number]   Medications: Verified on Patient summary List    Comorbidities: NONE.   Prior Level of Function: Self Employed , [place]    Visits from Start of Care: 11    Missed Visits: 2    Reporting Period : 9/17/19 to 10/14/19    Summary of Care:  Goal: Pt will increase FOTO score by 10   pts to improve LS function. Status at last note/certification: 62  Status at discharge: not met    Goal:  Pt will reports sx reduction by >80 % to ease with return to PLOF. Status at last note/certification: n/a  Status at discharge: not met    Goal: Pt will be able to stand up >1 hr w/o onset of pain to ease back to perform his job duties. Status at last note/certification: increased pain  Status at discharge: met    Goal: Pt. will report pain at worst <3/10 to ease with improving QOL.   Status at last note/certification: 5/15  Status at discharge: met    Pt. Has progressed well with physical therapy despite no significant change in FOTO score at 60 points. He report a 75% in symptoms since [facility] and no longer has increased pain with prolonged standing. His pain has decreased to 2/10 at most and typically improves with movement throughout the day. He was also able to lift and carry 55# without increased pain. He demonstrates good understanding of his HEP and was educated on continuing this following D/C.        ASSESSMENT/RECOMMENDATIONS:  [x]Discontinue therapy: [x]Patient has reached or is progressing toward set goals      []Patient is non-compliant or has abdicated      []Due to lack of appreciable progress towards set goals    Annamaria Lee PT 10/14/2019 1:36 PM

## 2019-10-16 ENCOUNTER — APPOINTMENT (OUTPATIENT)
Dept: PHYSICAL THERAPY | Age: 29
End: 2019-10-16
Payer: MEDICAID

## 2019-10-18 ENCOUNTER — APPOINTMENT (OUTPATIENT)
Dept: PHYSICAL THERAPY | Age: 29
End: 2019-10-18
Payer: MEDICAID

## 2019-10-21 ENCOUNTER — APPOINTMENT (OUTPATIENT)
Dept: PHYSICAL THERAPY | Age: 29
End: 2019-10-21
Payer: MEDICAID

## 2019-10-23 ENCOUNTER — APPOINTMENT (OUTPATIENT)
Dept: PHYSICAL THERAPY | Age: 29
End: 2019-10-23
Payer: MEDICAID

## 2019-10-25 ENCOUNTER — APPOINTMENT (OUTPATIENT)
Dept: PHYSICAL THERAPY | Age: 29
End: 2019-10-25
Payer: MEDICAID

## 2019-11-25 ENCOUNTER — HOSPITAL ENCOUNTER (OUTPATIENT)
Dept: GENERAL RADIOLOGY | Age: 29
Discharge: HOME | End: 2019-11-25
Payer: MEDICAID

## 2019-11-25 DIAGNOSIS — M25.552 PAIN IN LEFT HIP: ICD-10-CM

## 2019-11-25 PROCEDURE — 72110 X-RAY EXAM L-2 SPINE 4/>VWS: CPT

## 2019-11-25 PROCEDURE — 73521 X-RAY EXAM HIPS BI 2 VIEWS: CPT

## 2019-12-17 ENCOUNTER — OFFICE VISIT (OUTPATIENT)
Dept: ORTHOPEDIC SURGERY | Age: 29
End: 2019-12-17

## 2019-12-17 VITALS
DIASTOLIC BLOOD PRESSURE: 105 MMHG | BODY MASS INDEX: 34.65 KG/M2 | SYSTOLIC BLOOD PRESSURE: 149 MMHG | WEIGHT: 215.6 LBS | TEMPERATURE: 97.5 F | HEIGHT: 66 IN | RESPIRATION RATE: 15 BRPM | HEART RATE: 72 BPM

## 2019-12-17 DIAGNOSIS — Z87.39 H/O SLIPPED CAPITAL FEMORAL EPIPHYSIS (SCFE): ICD-10-CM

## 2019-12-17 DIAGNOSIS — M76.892 HIP FLEXOR TENDINITIS, LEFT: ICD-10-CM

## 2019-12-17 DIAGNOSIS — S39.012A LUMBAR STRAIN, INITIAL ENCOUNTER: Primary | ICD-10-CM

## 2019-12-17 DIAGNOSIS — M76.892 TENDINITIS OF LEFT HIP FLEXOR: ICD-10-CM

## 2019-12-17 RX ORDER — PREDNISONE 20 MG/1
TABLET ORAL
Qty: 28 TAB | Refills: 0 | Status: SHIPPED | OUTPATIENT
Start: 2019-12-17

## 2019-12-17 RX ORDER — DICLOFENAC SODIUM 75 MG/1
75 TABLET, DELAYED RELEASE ORAL 2 TIMES DAILY
COMMUNITY

## 2019-12-17 RX ORDER — BUPROPION HYDROCHLORIDE 150 MG/1
150 TABLET ORAL
COMMUNITY

## 2019-12-17 NOTE — PATIENT INSTRUCTIONS
Follow-up on referral to physical therapy, perform stretches 2 - 5 times daily, use medication as prescribed, and return to the office in about 6 weeks. Search YouTube for my channel: 
 
Dr. Stephanie Thomson Low back/Piriformis Hip Stretches
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
